# Patient Record
(demographics unavailable — no encounter records)

---

## 2025-02-24 NOTE — ASSESSMENT
[FreeTextEntry1] :  1. H/o Graves disease - clinically euthyroid, cont neuro w/up for shaking - keep off mmi, observe for symptoms and cont w/ freq tft's monitoring - monitor levels  2. MNG - rpt thyroid US in 05/26, and if stable q 3-5 yrs  3. Pituitary microadenoma  - not sure if developing acromegaly (pit microadenoma w/ elev IGF-1). most recent IGF-1 wnl - will monitor IGF-1/pit MRI, will consider rpt OGTT. ? eventual role of DA. - repeat a pit/brain MRI with her neurologist- will send me the report  4. ? PCOS - pt prev had positive response on metformin, cont metformin er 500mg 2 tabs qd and aldactone. monitor renal f-ns - keep off tri-sprintec. - f/u rheumatologist - prn melatonin, valerian root, chamomile tea.  5. Obesity - Current approaches to weight management are discussed with the patient.  Suggested extensive nutritional education program. Proper dietary restrictions and exercise routines discussed. Medical weight loss therapies were reviewed with the patient. Trial of Qsymia (R+B)  RTC 3 months

## 2025-02-24 NOTE — REASON FOR VISIT
[Follow - Up] : a follow-up visit [Hyperthyroidism] : hyperthyroidism [Pituitary Evaluation/ Disorder] : pituitary evaluation/disorder [PCOS] : PCOS [Weight Management/Obesity] : weight management/obesity

## 2025-02-24 NOTE — HISTORY OF PRESENT ILLNESS
[FreeTextEntry1] : 44 yo F  f/u for multiple medical issues  *** Feb 24, 2025 ***  feels well, but si concerned about gaining weight. feels more hungry more migraines recently. no h/o kidney stones, pancreatitis, gallstones on metformin er 500 mg 2 tabs daily, aldactone 50 mg bid,  off Adderall. took Lexapro for some time saw neuro last month did not do pit MRI last year no recent labs Thyroid US (5/29/24)- RLP 0.5 cm complex , LMP posterior 0.3 cm hypo  *** Feb 26, 2024 ***  feels fine, except for some fatigue. 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 All are stable  *** Feb 23, 2023 ***  feels well, lost weight (some diet, more active, stress) staying on metformin er 500 mg 2 tabs daily, aldactone 50 mg bid last year - prl- 73, ACTH- 119 with nl cortisol, (+) TSI/TBII repeat labs from [facility]- cortsiol- 22, ACTH- 23, prl- 21.8, (+) TRAB  Thyroid ultrasound (3/11/2022)-no suspicious thyroid nodules.  Perithyroidal lymphadenopathy suggestive of lymphocytic thyroiditis.  *** Feb 23, 2022 ***  had a left hip surgery past august; also had covid in 12/21. recovered well. staying on WW diet. lost 30 lbs overall. feels great taking B-complex and D3 no more palpitations remains metformin er 500 mg 2 tabs daily, aldactone 50 mg bid  *** Dec 09, 2020 ***  doing well. remains on metformin and aldactone. feels that both medications help a lot was exposed to  with COVID, but no symptoms except for 3 days diarrhea.  on the diet, WW. lost weight.  with on-off palpitations, occasional facial tingling sensations.   *** Kevyn 15, 2020 ***  took lexapro 10mg 1/2 tab x 4 mos- felt much better, and weaned herself off. Feel well still on metformin and aldactone feels well overall, except for cold intolerance and again with Raynaud's   *** Apr 22, 2019 ***   with fatigue, now with mood changes. recalls having mono in the past saw ortho- was told with DJD  and poss bone loss. lost some height acne improved much with adding aldactone  MRI pit (3/16/19)- normal sella. no pit masses. Slightly thickened stalk. Pit MRI (5/30/15)- 2.2mm midportion pituitary microadenoma. nl optic chiasm. non-specific minimal thickening of pit stalk (in midline) Pit MRI (6/7/14)- 2.4mm rt pit microadenoma. no mass effect on the optic chiasm. posterior aspect of anterior gland poss RCC or pars intermedia cyst. Nl cavernous sinus  TSH- 1.42, FT4- 1.3, T3- 106 testo/dheas/astd- nl neg TSI/TBII a1c- 5.1 FSH/LH- 6.1/9.5 prl- 9.0 IGF-1- 246  had another cervical spine surgery in 12/18 again with worsening cystic acne, fatigue. seeing derm. still on metformin 500mg bid, had aldactone 50mg bid added about a week ago. denies hirsutism MRI abd (3/18)- 1.4cm Rt ovarian follicle. no susp ovarian masses  no recent labs prior: testo- 143, free T-13.7 rest w/up- wnl. S/w pt- had a CT abd/pelvis in 10/17 ([facility])- normal adrenals. Right ovary is enlarged and abnormally located.   Thyr US (7/13/18)- Rt inferior- 0.4 hypo; stable Rt submandibular LN 2.2 + 1.6, morphologically benign Thyr US (11/15/17)- Rt inferior 0.4 hypo. Rt submandibular region- benign appearing LN 2.2cm + 1.6cm(likely reactive LN)  Feels well overall. gained weight, thinning of hair. still keeping dairy-free- helps with acne.  off ocp's again w/ constant anxiety, nervousness, mood changes. sleep is more interrupted no definitive panic attacks, no assoc w/ pallor of flushing. no diarrhea seeing derm- acne is much better off metformin/ ocp's/ inderal  eval  for Lt arm numbness and hands shaking - dx'ed c5-c6, c6-c7  bulging disk w/ cord compression. saw neurosx (Dr. Canales)- advised   anterior  spinal surgery on 4/18/16  prev saw neuro (Dr. Claros) off mmi since 08/15. stopped inderal  TSH- 1.3 <--1.3 <-- 1.2 <-- 1.7 <-- 1.79,  FT4- 1.00 <-- 1.26 <-- 1.02 <--1.0,  T3- 106 <-- 110 <-- 103 a1c- 5.2 <---5.3 neg Lyme ds. IGF-1- 182 <--209 25D- 37.6  prev felt better on dairy free diet. off paleo diet. less hair loss ;  no tremor, no double vision; sleep improved dramatically on melatonin 2.5mg (stopped it since does not need anymore).   with polyarthralgia and myalgia recently and fatigue. no recent tick bites. seeing Dr. Mann  TSH - 3.7 <-- 0.894 <-- 0.017,  FT4 - 0.9 <--0.94 <-- 1.18 T3-100 lft's/cbc-wnl B12/ B1/ folate- wnl  25D- 64.6 labs (3/19/15)- wbc- 12.2, neutr- 10.2 (1.4-7.0), lft's- wnl, TSH- 0.006, FT4- 2.01, T3- 152.  Labs (3/5/15) TSH- 0.01, FT4- 1.84 Labs (3/7/15)- TSH- 0.008, FT4- 1.79, T3- 176. + TSI/TBII/ Tg/TPO ab IGF-1- 274 (nl 69- 227)  stopped tri-sprintec b/o worsening acne  saw endo at  [place]- w/up wnl.  labs from [place] pres (11/14) - 24h UFC- 20, am cortisol 8.1 w/ ACTH 9.8,  OGTT (done through [facility])  baseline IGF-1 216, GH- 2.71 60'  GH- 0.12 90'  GH - 0.07 120' GH - 0.06  rpt IGF-1- 305, GH- 3.29, IGFBP3- 9.86 TFT's, prl, testo, DHEAS, ASTD, g-tropins, insulin- wnl IGF-1- 287, 25D- 19.4, + TPO ab  Thyr US (5/23/14)- heterogenous gland, Rt inferior lobe 0.4cm nodule Thyr US (10/13/14)- stable Rt inferior 0.4cm nodule Thyr US (10/19/15)- RLP hypo 0.4x0.4x0.2 (stable)   HPI: recently s/p FRANCIS (ovaries intact) for adenomyosis Menarche at 15 yo (induced by OCP's).  OCP's since 15 yo until 2013.  States that tried loestrin, lo-loestin, nuvaring, ortho-tri cyclen,  Has 2 children, reports history of GDM Again, with facial acne and some hair loss. Denies  increased facial/body hair growth No obvious weight gain, but it's difficult to lose despite exercising heavily (running half-marathons), dieting. Denies: breast discharge, shoe/ring size change, easy bruising or new purple stretch marks on the abdomen/thighs.   Also, patient's maternal GM had thyroid cancer. Patient denies h/o radiation exposure to head/neck area in a childhood

## 2025-02-24 NOTE — HISTORY OF PRESENT ILLNESS
[FreeTextEntry1] : 44 yo F  f/u for multiple medical issues  *** Feb 24, 2025 ***  feels well, but si concerned about gaining weight. feels more hungry more migraines recently. no h/o kidney stones, pancreatitis, gallstones on metformin er 500 mg 2 tabs daily, aldactone 50 mg bid,  off Adderall. took Lexapro for some time saw neuro last month did not do pit MRI last year no recent labs Thyroid US (5/29/24)- RLP 0.5 cm complex , LMP posterior 0.3 cm hypo  *** Feb 26, 2024 ***  feels fine, except for some fatigue. 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 All are stable  *** Feb 23, 2023 ***  feels well, lost weight (some diet, more active, stress) staying on metformin er 500 mg 2 tabs daily, aldactone 50 mg bid last year - prl- 73, ACTH- 119 with nl cortisol, (+) TSI/TBII repeat labs from [facility]- cortsiol- 22, ACTH- 23, prl- 21.8, (+) TRAB  Thyroid ultrasound (3/11/2022)-no suspicious thyroid nodules.  Perithyroidal lymphadenopathy suggestive of lymphocytic thyroiditis.  *** Feb 23, 2022 ***  had a left hip surgery past august; also had covid in 12/21. recovered well. staying on WW diet. lost 30 lbs overall. feels great taking B-complex and D3 no more palpitations remains metformin er 500 mg 2 tabs daily, aldactone 50 mg bid  *** Dec 09, 2020 ***  doing well. remains on metformin and aldactone. feels that both medications help a lot was exposed to  with COVID, but no symptoms except for 3 days diarrhea.  on the diet, WW. lost weight.  with on-off palpitations, occasional facial tingling sensations.   *** Kevyn 15, 2020 ***  took lexapro 10mg 1/2 tab x 4 mos- felt much better, and weaned herself off. Feel well still on metformin and aldactone feels well overall, except for cold intolerance and again with Raynaud's   *** Apr 22, 2019 ***   with fatigue, now with mood changes. recalls having mono in the past saw ortho- was told with DJD  and poss bone loss. lost some height acne improved much with adding aldactone  MRI pit (3/16/19)- normal sella. no pit masses. Slightly thickened stalk. Pit MRI (5/30/15)- 2.2mm midportion pituitary microadenoma. nl optic chiasm. non-specific minimal thickening of pit stalk (in midline) Pit MRI (6/7/14)- 2.4mm rt pit microadenoma. no mass effect on the optic chiasm. posterior aspect of anterior gland poss RCC or pars intermedia cyst. Nl cavernous sinus  TSH- 1.42, FT4- 1.3, T3- 106 testo/dheas/astd- nl neg TSI/TBII a1c- 5.1 FSH/LH- 6.1/9.5 prl- 9.0 IGF-1- 246  had another cervical spine surgery in 12/18 again with worsening cystic acne, fatigue. seeing derm. still on metformin 500mg bid, had aldactone 50mg bid added about a week ago. denies hirsutism MRI abd (3/18)- 1.4cm Rt ovarian follicle. no susp ovarian masses  no recent labs prior: testo- 143, free T-13.7 rest w/up- wnl. S/w pt- had a CT abd/pelvis in 10/17 ([facility])- normal adrenals. Right ovary is enlarged and abnormally located.   Thyr US (7/13/18)- Rt inferior- 0.4 hypo; stable Rt submandibular LN 2.2 + 1.6, morphologically benign Thyr US (11/15/17)- Rt inferior 0.4 hypo. Rt submandibular region- benign appearing LN 2.2cm + 1.6cm(likely reactive LN)  Feels well overall. gained weight, thinning of hair. still keeping dairy-free- helps with acne.  off ocp's again w/ constant anxiety, nervousness, mood changes. sleep is more interrupted no definitive panic attacks, no assoc w/ pallor of flushing. no diarrhea seeing derm- acne is much better off metformin/ ocp's/ inderal  eval  for Lt arm numbness and hands shaking - dx'ed c5-c6, c6-c7  bulging disk w/ cord compression. saw neurosx (Dr. Canales)- advised   anterior  spinal surgery on 4/18/16  prev saw neuro (Dr. Claros) off mmi since 08/15. stopped inderal  TSH- 1.3 <--1.3 <-- 1.2 <-- 1.7 <-- 1.79,  FT4- 1.00 <-- 1.26 <-- 1.02 <--1.0,  T3- 106 <-- 110 <-- 103 a1c- 5.2 <---5.3 neg Lyme ds. IGF-1- 182 <--209 25D- 37.6  prev felt better on dairy free diet. off paleo diet. less hair loss ;  no tremor, no double vision; sleep improved dramatically on melatonin 2.5mg (stopped it since does not need anymore).   with polyarthralgia and myalgia recently and fatigue. no recent tick bites. seeing Dr. Mann  TSH - 3.7 <-- 0.894 <-- 0.017,  FT4 - 0.9 <--0.94 <-- 1.18 T3-100 lft's/cbc-wnl B12/ B1/ folate- wnl  25D- 64.6 labs (3/19/15)- wbc- 12.2, neutr- 10.2 (1.4-7.0), lft's- wnl, TSH- 0.006, FT4- 2.01, T3- 152.  Labs (3/5/15) TSH- 0.01, FT4- 1.84 Labs (3/7/15)- TSH- 0.008, FT4- 1.79, T3- 176. + TSI/TBII/ Tg/TPO ab IGF-1- 274 (nl 69- 227)  stopped tri-sprintec b/o worsening acne  saw endo at  [place]- w/up wnl.  labs from [place] pres (11/14) - 24h UFC- 20, am cortisol 8.1 w/ ACTH 9.8,  OGTT (done through [facility])  baseline IGF-1 216, GH- 2.71 60'  GH- 0.12 90'  GH - 0.07 120' GH - 0.06  rpt IGF-1- 305, GH- 3.29, IGFBP3- 9.86 TFT's, prl, testo, DHEAS, ASTD, g-tropins, insulin- wnl IGF-1- 287, 25D- 19.4, + TPO ab  Thyr US (5/23/14)- heterogenous gland, Rt inferior lobe 0.4cm nodule Thyr US (10/13/14)- stable Rt inferior 0.4cm nodule Thyr US (10/19/15)- RLP hypo 0.4x0.4x0.2 (stable)   HPI: recently s/p FRANCIS (ovaries intact) for adenomyosis Menarche at 15 yo (induced by OCP's).  OCP's since 15 yo until 2013.  States that tried loestrin, lo-loestin, nuvaring, ortho-tri cyclen,  Has 2 children, reports history of GDM Again, with facial acne and some hair loss. Denies  increased facial/body hair growth No obvious weight gain, but it's difficult to lose despite exercising heavily (running half-marathons), dieting. Denies: breast discharge, shoe/ring size change, easy bruising or new purple stretch marks on the abdomen/thighs.   Also, patient's maternal GM had thyroid cancer. Patient denies h/o radiation exposure to head/neck area in a childhood

## 2025-05-29 NOTE — HISTORY OF PRESENT ILLNESS
[FreeTextEntry1] : 44 yo F  f/u for multiple medical issues  *** May 29, 2025 ***  Recent labs reviewed as below TSH-0.01, IGF-I-222 Started on methimazole 5 mg daily.  Remains on metformin  mg 2 tablets daily, spironolactone 50 mg twice daily. was briefly admitted to [facility] for an acute pleuritic CP - dx'ed with a ? mild pericarditis Echo- unremarkable (?), but started on colchicine not taking Qsymia- too expensive Nonspecified arthralgias, hot flashes. Total hysterectomy several years ago.  Prior not OCPs without any issues.  No history of breast cancer, blood clots. CT angio (3/26/25)-- calcium score is 0 Pituitary MRI (3/15/2025)-Unremarkable MR the pituitary.  No pituitary adenoma recognized  *** Feb 24, 2025 ***  feels well, but si concerned about gaining weight. feels more hungry more migraines recently. no h/o kidney stones, pancreatitis, gallstones on metformin er 500 mg 2 tabs daily, aldactone 50 mg bid,  off Adderall. took Lexapro for some time saw neuro last month did not do pit MRI last year no recent labs Thyroid US (5/29/24)- RLP 0.5 cm complex , LMP posterior 0.3 cm hypo  *** Feb 26, 2024 ***  feels fine, except for some fatigue. 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 All are stable  *** Feb 23, 2023 ***  feels well, lost weight (some diet, more active, stress) staying on metformin er 500 mg 2 tabs daily, aldactone 50 mg bid last year - prl- 73, ACTH- 119 with nl cortisol, (+) TSI/TBII repeat labs from [facility]- cortsiol- 22, ACTH- 23, prl- 21.8, (+) TRAB  Thyroid ultrasound (3/11/2022)-no suspicious thyroid nodules.  Perithyroidal lymphadenopathy suggestive of lymphocytic thyroiditis.  *** Feb 23, 2022 ***  had a left hip surgery past august; also had covid in 12/21. recovered well. staying on WW diet. lost 30 lbs overall. feels great taking B-complex and D3 no more palpitations remains metformin er 500 mg 2 tabs daily, aldactone 50 mg bid  *** Dec 09, 2020 ***  doing well. remains on metformin and aldactone. feels that both medications help a lot was exposed to  with COVID, but no symptoms except for 3 days diarrhea.  on the diet, WW. lost weight.  with on-off palpitations, occasional facial tingling sensations.   *** Kevyn 15, 2020 ***  took lexapro 10mg 1/2 tab x 4 mos- felt much better, and weaned herself off. Feel well still on metformin and aldactone feels well overall, except for cold intolerance and again with Raynaud's   *** Apr 22, 2019 ***   with fatigue, now with mood changes. recalls having mono in the past saw ortho- was told with DJD  and poss bone loss. lost some height acne improved much with adding aldactone  MRI pit (3/16/19)- normal sella. no pit masses. Slightly thickened stalk. Pit MRI (5/30/15)- 2.2mm midportion pituitary microadenoma. nl optic chiasm. non-specific minimal thickening of pit stalk (in midline) Pit MRI (6/7/14)- 2.4mm rt pit microadenoma. no mass effect on the optic chiasm. posterior aspect of anterior gland poss RCC or pars intermedia cyst. Nl cavernous sinus  TSH- 1.42, FT4- 1.3, T3- 106 testo/dheas/astd- nl neg TSI/TBII a1c- 5.1 FSH/LH- 6.1/9.5 prl- 9.0 IGF-1- 246  had another cervical spine surgery in 12/18 again with worsening cystic acne, fatigue. seeing derm. still on metformin 500mg bid, had aldactone 50mg bid added about a week ago. denies hirsutism MRI abd (3/18)- 1.4cm Rt ovarian follicle. no susp ovarian masses  no recent labs prior: testo- 143, free T-13.7 rest w/up- wnl. S/w pt- had a CT abd/pelvis in 10/17 ([facility])- normal adrenals. Right ovary is enlarged and abnormally located.   Thyr US (7/13/18)- Rt inferior- 0.4 hypo; stable Rt submandibular LN 2.2 + 1.6, morphologically benign Thyr US (11/15/17)- Rt inferior 0.4 hypo. Rt submandibular region- benign appearing LN 2.2cm + 1.6cm(likely reactive LN)  Feels well overall. gained weight, thinning of hair. still keeping dairy-free- helps with acne.  off ocp's again w/ constant anxiety, nervousness, mood changes. sleep is more interrupted no definitive panic attacks, no assoc w/ pallor of flushing. no diarrhea seeing derm- acne is much better off metformin/ ocp's/ inderal  eval  for Lt arm numbness and hands shaking - dx'ed c5-c6, c6-c7  bulging disk w/ cord compression. saw neurosx (Dr. Canales)- advised   anterior  spinal surgery on 4/18/16  prev saw neuro (Dr. Claros) off mmi since 08/15. stopped inderal  TSH- 1.3 <--1.3 <-- 1.2 <-- 1.7 <-- 1.79,  FT4- 1.00 <-- 1.26 <-- 1.02 <--1.0,  T3- 106 <-- 110 <-- 103 a1c- 5.2 <---5.3 neg Lyme ds. IGF-1- 182 <--209 25D- 37.6  prev felt better on dairy free diet. off paleo diet. less hair loss ;  no tremor, no double vision; sleep improved dramatically on melatonin 2.5mg (stopped it since does not need anymore).   with polyarthralgia and myalgia recently and fatigue. no recent tick bites. seeing Dr. Mann  TSH - 3.7 <-- 0.894 <-- 0.017,  FT4 - 0.9 <--0.94 <-- 1.18 T3-100 lft's/cbc-wnl B12/ B1/ folate- wnl  25D- 64.6 labs (3/19/15)- wbc- 12.2, neutr- 10.2 (1.4-7.0), lft's- wnl, TSH- 0.006, FT4- 2.01, T3- 152.  Labs (3/5/15) TSH- 0.01, FT4- 1.84 Labs (3/7/15)- TSH- 0.008, FT4- 1.79, T3- 176. + TSI/TBII/ Tg/TPO ab IGF-1- 274 (nl 69- 227)  stopped tri-sprintec b/o worsening acne  saw endo at  [place]- w/up wnl.  labs from [place] [facility] (11/14) - 24h UFC- 20, am cortisol 8.1 w/ ACTH 9.8,  OGTT (done through [facility])  baseline IGF-1 216, GH- 2.71 60'  GH- 0.12 90'  GH - 0.07 120' GH - 0.06  rpt IGF-1- 305, GH- 3.29, IGFBP3- 9.86 TFT's, prl, testo, DHEAS, ASTD, g-tropins, insulin- wnl IGF-1- 287, 25D- 19.4, + TPO ab  Thyr US (5/23/14)- heterogenous gland, Rt inferior lobe 0.4cm nodule Thyr US (10/13/14)- stable Rt inferior 0.4cm nodule Thyr US (10/19/15)- RLP hypo 0.4x0.4x0.2 (stable)   HPI: recently s/p FRANCIS (ovaries intact) for adenomyosis Menarche at 15 yo (induced by OCP's).  OCP's since 15 yo until 2013.  States that tried loestrin, lo-loestin, nuvaring, ortho-tri cyclen,  Has 2 children, reports history of GDM Again, with facial acne and some hair loss. Denies  increased facial/body hair growth No obvious weight gain, but it's difficult to lose despite exercising heavily (running half-marathons), dieting. Denies: breast discharge, shoe/ring size change, easy bruising or new purple stretch marks on the abdomen/thighs.   Also, patient's maternal GM had thyroid cancer. Patient denies h/o radiation exposure to head/neck area in a childhood

## 2025-05-29 NOTE — REASON FOR VISIT
[Follow - Up] : a follow-up visit [Hyperthyroidism] : hyperthyroidism [Pituitary Evaluation/ Disorder] : pituitary evaluation/disorder [Weight Management/Obesity] : weight management/obesity [PCOS] : PCOS

## 2025-05-29 NOTE — ASSESSMENT
[FreeTextEntry1] :  1. Relapsing Graves disease - MMI 5 mg qd, pending results - Inderal 10 mg bid-tid as needed - monitor levels  2. MNG - rpt thyroid US in 05/26, and if stable q 3-5 yrs  3. H/o pituitary microadenoma  - not sure if developing acromegaly (pit microadenoma w/ elev IGF-1). most recent IGF-1 wnl - will monitor IGF-1/pit MRI, will consider rpt OGTT. ? eventual role of DA. - last  pit/brain MRI is neg for adenoma. F/u with her neurologist- will send me the report  4. ? PCOS - pt prev had positive response on metformin, cont metformin er 500mg 2 tabs qd and aldactone. monitor renal f-ns - keep off tri-sprintec. - f/u rheumatologist - prn melatonin, valerian root, chamomile tea.  5. Obesity - Current approaches to weight management are discussed with the patient.  Suggested extensive nutritional education program. Proper dietary restrictions and exercise routines discussed. Medical weight loss therapies were reviewed with the patient. Qsymia is expensive. would wait with phentermine for now in view of recent cardiac issues. Advised on GLP1RA- patient wants to research. Interim, will refer for a home sleep study.  6.  Likely perimenopausal. We reviewed in details current approaches to menopausal symptoms management, including use of Menopausal hormone therapy. Might be a good candidate for HRT.  Will not need progesterone since she does not have uterus.  Patient wants to research and discuss with her gynecologist. RTC 3 months

## 2025-05-29 NOTE — HISTORY OF PRESENT ILLNESS
[FreeTextEntry1] : 44 yo F  f/u for multiple medical issues  *** May 29, 2025 ***  Recent labs reviewed as below TSH-0.01, IGF-I-222 Started on methimazole 5 mg daily.  Remains on metformin  mg 2 tablets daily, spironolactone 50 mg twice daily. was briefly admitted to [facility] for an acute pleuritic CP - dx'ed with a ? mild pericarditis Echo- unremarkable (?), but started on colchicine not taking Qsymia- too expensive Nonspecified arthralgias, hot flashes. Total hysterectomy several years ago.  Prior not OCPs without any issues.  No history of breast cancer, blood clots. CT angio (3/26/25)-- calcium score is 0 Pituitary MRI (3/15/2025)-Unremarkable MR the pituitary.  No pituitary adenoma recognized  *** Feb 24, 2025 ***  feels well, but si concerned about gaining weight. feels more hungry more migraines recently. no h/o kidney stones, pancreatitis, gallstones on metformin er 500 mg 2 tabs daily, aldactone 50 mg bid,  off Adderall. took Lexapro for some time saw neuro last month did not do pit MRI last year no recent labs Thyroid US (5/29/24)- RLP 0.5 cm complex , LMP posterior 0.3 cm hypo  *** Feb 26, 2024 ***  feels fine, except for some fatigue. 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 All are stable  *** Feb 23, 2023 ***  feels well, lost weight (some diet, more active, stress) staying on metformin er 500 mg 2 tabs daily, aldactone 50 mg bid last year - prl- 73, ACTH- 119 with nl cortisol, (+) TSI/TBII repeat labs from [facility]- cortsiol- 22, ACTH- 23, prl- 21.8, (+) TRAB  Thyroid ultrasound (3/11/2022)-no suspicious thyroid nodules.  Perithyroidal lymphadenopathy suggestive of lymphocytic thyroiditis.  *** Feb 23, 2022 ***  had a left hip surgery past august; also had covid in 12/21. recovered well. staying on WW diet. lost 30 lbs overall. feels great taking B-complex and D3 no more palpitations remains metformin er 500 mg 2 tabs daily, aldactone 50 mg bid  *** Dec 09, 2020 ***  doing well. remains on metformin and aldactone. feels that both medications help a lot was exposed to  with COVID, but no symptoms except for 3 days diarrhea.  on the diet, WW. lost weight.  with on-off palpitations, occasional facial tingling sensations.   *** Kevyn 15, 2020 ***  took lexapro 10mg 1/2 tab x 4 mos- felt much better, and weaned herself off. Feel well still on metformin and aldactone feels well overall, except for cold intolerance and again with Raynaud's   *** Apr 22, 2019 ***   with fatigue, now with mood changes. recalls having mono in the past saw ortho- was told with DJD  and poss bone loss. lost some height acne improved much with adding aldactone  MRI pit (3/16/19)- normal sella. no pit masses. Slightly thickened stalk. Pit MRI (5/30/15)- 2.2mm midportion pituitary microadenoma. nl optic chiasm. non-specific minimal thickening of pit stalk (in midline) Pit MRI (6/7/14)- 2.4mm rt pit microadenoma. no mass effect on the optic chiasm. posterior aspect of anterior gland poss RCC or pars intermedia cyst. Nl cavernous sinus  TSH- 1.42, FT4- 1.3, T3- 106 testo/dheas/astd- nl neg TSI/TBII a1c- 5.1 FSH/LH- 6.1/9.5 prl- 9.0 IGF-1- 246  had another cervical spine surgery in 12/18 again with worsening cystic acne, fatigue. seeing derm. still on metformin 500mg bid, had aldactone 50mg bid added about a week ago. denies hirsutism MRI abd (3/18)- 1.4cm Rt ovarian follicle. no susp ovarian masses  no recent labs prior: testo- 143, free T-13.7 rest w/up- wnl. S/w pt- had a CT abd/pelvis in 10/17 ([facility])- normal adrenals. Right ovary is enlarged and abnormally located.   Thyr US (7/13/18)- Rt inferior- 0.4 hypo; stable Rt submandibular LN 2.2 + 1.6, morphologically benign Thyr US (11/15/17)- Rt inferior 0.4 hypo. Rt submandibular region- benign appearing LN 2.2cm + 1.6cm(likely reactive LN)  Feels well overall. gained weight, thinning of hair. still keeping dairy-free- helps with acne.  off ocp's again w/ constant anxiety, nervousness, mood changes. sleep is more interrupted no definitive panic attacks, no assoc w/ pallor of flushing. no diarrhea seeing derm- acne is much better off metformin/ ocp's/ inderal  eval  for Lt arm numbness and hands shaking - dx'ed c5-c6, c6-c7  bulging disk w/ cord compression. saw neurosx (Dr. Canales)- advised   anterior  spinal surgery on 4/18/16  prev saw neuro (Dr. Claros) off mmi since 08/15. stopped inderal  TSH- 1.3 <--1.3 <-- 1.2 <-- 1.7 <-- 1.79,  FT4- 1.00 <-- 1.26 <-- 1.02 <--1.0,  T3- 106 <-- 110 <-- 103 a1c- 5.2 <---5.3 neg Lyme ds. IGF-1- 182 <--209 25D- 37.6  prev felt better on dairy free diet. off paleo diet. less hair loss ;  no tremor, no double vision; sleep improved dramatically on melatonin 2.5mg (stopped it since does not need anymore).   with polyarthralgia and myalgia recently and fatigue. no recent tick bites. seeing Dr. Mann  TSH - 3.7 <-- 0.894 <-- 0.017,  FT4 - 0.9 <--0.94 <-- 1.18 T3-100 lft's/cbc-wnl B12/ B1/ folate- wnl  25D- 64.6 labs (3/19/15)- wbc- 12.2, neutr- 10.2 (1.4-7.0), lft's- wnl, TSH- 0.006, FT4- 2.01, T3- 152.  Labs (3/5/15) TSH- 0.01, FT4- 1.84 Labs (3/7/15)- TSH- 0.008, FT4- 1.79, T3- 176. + TSI/TBII/ Tg/TPO ab IGF-1- 274 (nl 69- 227)  stopped tri-sprintec b/o worsening acne  saw endo at  [facility]- w/up wnl.  labs from [place] [facility] (11/14) - 24h UFC- 20, am cortisol 8.1 w/ ACTH 9.8,  OGTT (done through [facility])  baseline IGF-1 216, GH- 2.71 60'  GH- 0.12 90'  GH - 0.07 120' GH - 0.06  rpt IGF-1- 305, GH- 3.29, IGFBP3- 9.86 TFT's, prl, testo, DHEAS, ASTD, g-tropins, insulin- wnl IGF-1- 287, 25D- 19.4, + TPO ab  Thyr US (5/23/14)- heterogenous gland, Rt inferior lobe 0.4cm nodule Thyr US (10/13/14)- stable Rt inferior 0.4cm nodule Thyr US (10/19/15)- RLP hypo 0.4x0.4x0.2 (stable)   HPI: recently s/p FRANCIS (ovaries intact) for adenomyosis Menarche at 15 yo (induced by OCP's).  OCP's since 15 yo until 2013.  States that tried loestrin, lo-loestin, nuvaring, ortho-tri cyclen,  Has 2 children, reports history of GDM Again, with facial acne and some hair loss. Denies  increased facial/body hair growth No obvious weight gain, but it's difficult to lose despite exercising heavily (running half-marathons), dieting. Denies: breast discharge, shoe/ring size change, easy bruising or new purple stretch marks on the abdomen/thighs.   Also, patient's maternal GM had thyroid cancer. Patient denies h/o radiation exposure to head/neck area in a childhood

## 2025-07-25 NOTE — ASSESSMENT
[FreeTextEntry1] :  1. Relapsing Graves disease - MMI 5 mg qd, pending results - Inderal 10 mg bid-tid as needed - monitor levels  2. MNG - rpt thyroid US in 05/26, and if stable q 3-5 yrs  3. H/o pituitary microadenoma  - not sure if developing acromegaly (pit microadenoma w/ elev IGF-1). most recent IGF-1 wnl - will monitor IGF-1/pit MRI, will consider rpt OGTT. ? eventual role of DA. - last  pit/brain MRI is neg for adenoma. F/u with her neurologist- will send me the report  4. ? PCOS - pt prev had positive response on metformin, cont metformin er 500mg 2 tabs qd and aldactone. monitor renal f-ns - keep off tri-sprintec. - f/u rheumatologist - prn melatonin, valerian root, chamomile tea.  5. Obesity - Current approaches to weight management are discussed with the patient.  Suggested extensive nutritional education program. Proper dietary restrictions and exercise routines discussed. Medical weight loss therapies were reviewed with the patient.  Avoid phentermine/ contrave in  view of recent cardiac issues and Graves' disease.  Will give a trial of Zepbound 2.5 mg weekly (risks and benefits reviewed).  6.  Likely perimenopausal. We reviewed in details current approaches to menopausal symptoms management, including use of Menopausal hormone therapy. Might be a good candidate for HRT.  Will not need progesterone since she does not have uterus.  Start Vivelle patch 0.05 mg twice a week RTC next month as scheduled

## 2025-07-25 NOTE — HISTORY OF PRESENT ILLNESS
[Home] : at home, [unfilled] , at the time of the visit. [Medical Office: ([facility])___] : at the medical office located in  [Telehealth (audio & video)] : This visit was provided via telehealth using real-time 2-way audio visual technology. [Verbal consent obtained from patient] : the patient, [unfilled] [FreeTextEntry1] : 44 yo F  f/u for multiple medical issues  *** Televisit  Jul 25, 2025 ***  Concerned with weight gain unable to lose despite a strict diet, being physically active using inderal still with bad hot flashes, arthralgia. mood is up and down Sleep study- normal  *** May 29, 2025 ***  Recent labs reviewed as below TSH-0.01, IGF-I-222 Started on methimazole 5 mg daily.  Remains on metformin  mg 2 tablets daily, spironolactone 50 mg twice daily. was briefly admitted to [facility] for an acute pleuritic CP - dx'ed with a ? mild pericarditis Echo- unremarkable (?), but started on colchicine not taking Qsymia- too expensive Nonspecified arthralgias, hot flashes. Total hysterectomy several years ago.  Prior on OCPs without any issues.  No history of breast cancer, blood clots. CT angio (3/26/25)-- calcium score is 0 Pituitary MRI (3/15/2025)-Unremarkable MR the pituitary.  No pituitary adenoma recognized  *** Feb 24, 2025 ***  feels well, but si concerned about gaining weight. feels more hungry more migraines recently. no h/o kidney stones, pancreatitis, gallstones on metformin er 500 mg 2 tabs daily, aldactone 50 mg bid,  off Adderall. took Lexapro for some time saw neuro last month did not do pit MRI last year no recent labs Thyroid US (5/29/24)- RLP 0.5 cm complex , LMP posterior 0.3 cm hypo  *** Feb 26, 2024 ***  feels fine, except for some fatigue. 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 All are stable  *** Feb 23, 2023 ***  feels well, lost weight (some diet, more active, stress) staying on metformin er 500 mg 2 tabs daily, aldactone 50 mg bid last year - prl- 73, ACTH- 119 with nl cortisol, (+) TSI/TBII repeat labs from [facility]- cortsiol- 22, ACTH- 23, prl- 21.8, (+) TRAB  Thyroid ultrasound (3/11/2022)-no suspicious thyroid nodules.  Perithyroidal lymphadenopathy suggestive of lymphocytic thyroiditis.  *** Feb 23, 2022 ***  had a left hip surgery past august; also had covid in 12/21. recovered well. staying on WW diet. lost 30 lbs overall. feels great taking B-complex and D3 no more palpitations remains metformin er 500 mg 2 tabs daily, aldactone 50 mg bid  *** Dec 09, 2020 ***  doing well. remains on metformin and aldactone. feels that both medications help a lot was exposed to  with COVID, but no symptoms except for 3 days diarrhea.  on the diet, WW. lost weight.  with on-off palpitations, occasional facial tingling sensations.   *** Kevyn 15, 2020 ***  took lexapro 10mg 1/2 tab x 4 mos- felt much better, and weaned herself off. Feel well still on metformin and aldactone feels well overall, except for cold intolerance and again with Raynaud's   *** Apr 22, 2019 ***   with fatigue, now with mood changes. recalls having mono in the past saw ortho- was told with DJD  and poss bone loss. lost some height acne improved much with adding aldactone  MRI pit (3/16/19)- normal sella. no pit masses. Slightly thickened stalk. Pit MRI (5/30/15)- 2.2mm midportion pituitary microadenoma. nl optic chiasm. non-specific minimal thickening of pit stalk (in midline) Pit MRI (6/7/14)- 2.4mm rt pit microadenoma. no mass effect on the optic chiasm. posterior aspect of anterior gland poss RCC or pars intermedia cyst. Nl cavernous sinus  TSH- 1.42, FT4- 1.3, T3- 106 testo/dheas/astd- nl neg TSI/TBII a1c- 5.1 FSH/LH- 6.1/9.5 prl- 9.0 IGF-1- 246  had another cervical spine surgery in 12/18 again with worsening cystic acne, fatigue. seeing derm. still on metformin 500mg bid, had aldactone 50mg bid added about a week ago. denies hirsutism MRI abd (3/18)- 1.4cm Rt ovarian follicle. no susp ovarian masses  no recent labs prior: testo- 143, free T-13.7 rest w/up- wnl. S/w pt- had a CT abd/pelvis in 10/17 ([facility])- normal adrenals. Right ovary is enlarged and abnormally located.   Thyr US (7/13/18)- Rt inferior- 0.4 hypo; stable Rt submandibular LN 2.2 + 1.6, morphologically benign Thyr US (11/15/17)- Rt inferior 0.4 hypo. Rt submandibular region- benign appearing LN 2.2cm + 1.6cm(likely reactive LN)  Feels well overall. gained weight, thinning of hair. still keeping dairy-free- helps with acne.  off ocp's again w/ constant anxiety, nervousness, mood changes. sleep is more interrupted no definitive panic attacks, no assoc w/ pallor of flushing. no diarrhea seeing derm- acne is much better off metformin/ ocp's/ inderal  eval  for Lt arm numbness and hands shaking - dx'ed c5-c6, c6-c7  bulging disk w/ cord compression. saw neurosx (Dr. Canales)- advised   anterior  spinal surgery on 4/18/16  prev saw neuro (Dr. Claros) off mmi since 08/15. stopped inderal  TSH- 1.3 <--1.3 <-- 1.2 <-- 1.7 <-- 1.79,  FT4- 1.00 <-- 1.26 <-- 1.02 <--1.0,  T3- 106 <-- 110 <-- 103 a1c- 5.2 <---5.3 neg Lyme ds. IGF-1- 182 <--209 25D- 37.6  prev felt better on dairy free diet. off paleo diet. less hair loss ;  no tremor, no double vision; sleep improved dramatically on melatonin 2.5mg (stopped it since does not need anymore).   with polyarthralgia and myalgia recently and fatigue. no recent tick bites. seeing Dr. Mann  TSH - 3.7 <-- 0.894 <-- 0.017,  FT4 - 0.9 <--0.94 <-- 1.18 T3-100 lft's/cbc-wnl B12/ B1/ folate- wnl  25D- 64.6 labs (3/19/15)- wbc- 12.2, neutr- 10.2 (1.4-7.0), lft's- wnl, TSH- 0.006, FT4- 2.01, T3- 152.  Labs (3/5/15) TSH- 0.01, FT4- 1.84 Labs (3/7/15)- TSH- 0.008, FT4- 1.79, T3- 176. + TSI/TBII/ Tg/TPO ab IGF-1- 274 (nl 69- 227)  stopped tri-sprintec b/o worsening acne  saw endo at  [facility]- w/up wnl.  labs from [facility] (11/14) - 24h UFC- 20, am cortisol 8.1 w/ ACTH 9.8,  OGTT (done through [facility])  baseline IGF-1 216, GH- 2.71 60'  GH- 0.12 90'  GH - 0.07 120' GH - 0.06  rpt IGF-1- 305, GH- 3.29, IGFBP3- 9.86 TFT's, prl, testo, DHEAS, ASTD, g-tropins, insulin- wnl IGF-1- 287, 25D- 19.4, + TPO ab  Thyr US (5/23/14)- heterogenous gland, Rt inferior lobe 0.4cm nodule Thyr US (10/13/14)- stable Rt inferior 0.4cm nodule Thyr US (10/19/15)- RLP hypo 0.4x0.4x0.2 (stable)   HPI: recently s/p FRANCIS (ovaries intact) for adenomyosis Menarche at 15 yo (induced by OCP's).  OCP's since 15 yo until 2013.  States that tried loestrin, lo-loestin, nuvaring, ortho-tri cyclen,  Has 2 children, reports history of GDM Again, with facial acne and some hair loss. Denies  increased facial/body hair growth No obvious weight gain, but it's difficult to lose despite exercising heavily (running half-marathons), dieting. Denies: breast discharge, shoe/ring size change, easy bruising or new purple stretch marks on the abdomen/thighs.   Also, patient's maternal GM had thyroid cancer. Patient denies h/o radiation exposure to head/neck area in a childhood